# Patient Record
Sex: MALE | Race: WHITE | NOT HISPANIC OR LATINO
[De-identification: names, ages, dates, MRNs, and addresses within clinical notes are randomized per-mention and may not be internally consistent; named-entity substitution may affect disease eponyms.]

---
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
## 2025-01-31 ENCOUNTER — HOSPITAL ENCOUNTER (EMERGENCY)
Facility: HOSPITAL | Age: 20
Discharge: HOME | End: 2025-01-31
Attending: EMERGENCY MEDICINE
Payer: MEDICAID

## 2025-01-31 ENCOUNTER — APPOINTMENT (OUTPATIENT)
Dept: GENERAL RADIOLOGY | Facility: HOSPITAL | Age: 20
End: 2025-01-31
Attending: EMERGENCY MEDICINE
Payer: MEDICAID

## 2025-01-31 VITALS
OXYGEN SATURATION: 98 % | DIASTOLIC BLOOD PRESSURE: 89 MMHG | WEIGHT: 130 LBS | RESPIRATION RATE: 18 BRPM | TEMPERATURE: 98 F | SYSTOLIC BLOOD PRESSURE: 137 MMHG | HEART RATE: 102 BPM

## 2025-01-31 DIAGNOSIS — S46.911A MUSCLE STRAIN OF RIGHT UPPER ARM, INITIAL ENCOUNTER: ICD-10-CM

## 2025-01-31 DIAGNOSIS — S50.01XA CONTUSION OF RIGHT ELBOW, INITIAL ENCOUNTER: Primary | ICD-10-CM

## 2025-01-31 PROCEDURE — 99284 EMERGENCY DEPT VISIT MOD MDM: CPT

## 2025-01-31 PROCEDURE — 73080 X-RAY EXAM OF ELBOW: CPT | Performed by: EMERGENCY MEDICINE

## 2025-01-31 PROCEDURE — 99283 EMERGENCY DEPT VISIT LOW MDM: CPT

## 2025-01-31 NOTE — DISCHARGE INSTRUCTIONS
Take ibuprofen or Tylenol as needed for pain.  Follow-up with orthopedics if your pain persists.  Also follow-up with neurology and a primary care physician.  Do not drive or operate heavy machinery until cleared to do so by neurology.  Return to the emergency department if recurrent seizure, focal weakness, numbness, or other new symptoms develop.

## 2025-01-31 NOTE — ED INITIAL ASSESSMENT (HPI)
Pt c/o difficulty moving right arm since Saturday. Pt states he had a seizure on last Saturday and fell on right elbow.

## 2025-01-31 NOTE — ED PROVIDER NOTES
Patient Seen in: [facility] Emergency Department      History     Chief Complaint   Patient presents with    Arm or Hand Injury     Stated Complaint: seizure saturday, R arm pain    Subjective:   HPI      19-year-old male with no known significant past medical history presents with complaints of right arm pain.  The patient reports that he has had intermittent pain to his right arm over the the past year.  He reports that he had a seizure on 1/25 during which he fell onto his right elbow.  He states since that time he has had increased pain to both his elbow and his biceps area.  The patient was evaluated at [facility] after his seizure.  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  Was to follow-up with neurology for further evaluation and further diagnostic testing but signed out AGAINST MEDICAL ADVICE and has not arranged for any follow-up.  He denies any history of seizure prior to that episode.  He denies any subsequent seizures.    Objective:     Past Medical History:    HEART MURMUR    NL Echo per Mom              History reviewed. No pertinent surgical history.             Social History     Socioeconomic History    Marital status: Single   Tobacco Use    Smoking status: Some Days     Types: Cigarettes    Smokeless tobacco: Never   Vaping Use    Vaping status: Some Days   Substance and Sexual Activity    Alcohol use: Never    Drug use: Yes     Types: Cannabis                  Physical Exam     ED Triage Vitals [01/31/25 0934]   /89   Pulse 102   Resp 18   Temp 97.8 °F (36.6 °C)   Temp src    SpO2 98 %   O2 Device        Current Vitals:   Vital Signs  BP: 137/89  Pulse: 102  Resp: 18  Temp: 97.8 °F (36.6 °C)    Oxygen Therapy  SpO2: 98 %        Physical Exam  General Appearance: well appearing  Eyes: pupils equal and round no injection  Respiratory: chest is non tender, lungs are clear to auscultation  Cardiac: regular rate and rhythm  Musculoskeletal: neck is supple  and non tender         Tenderness to palpation to the olecranon process and lateral epicondyle of the right elbow.  There is some mild tenderness to palpation of the belly of the biceps on the right as well but biceps function is intact and patient is able to flex at the elbow against resistance.  No shoulder, wrist, or hand tenderness noted.  Bilateral radial pulses intact and symmetric.  Capillary refill less than 2 seconds to the fingertips.  Neurologic: Speech normal.  Motor and sensation is intact and symmetric to bilateral upper extremities.  Skin: no rashes or lesions    DIFFERENTIAL DIAGNOSIS: After history and physical exam differential diagnosis was considered for contusion, fracture, strain, or other      ED Course   Labs Reviewed - No data to display              MDM      Elbow x-ray was reviewed independently by me.  No fracture or dislocation identified.  Suspect elbow contusion.  Also possible muscular strain.  Will give the patient a sling for comfort and recommend ibuprofen or Tylenol for pain.  Will give orthopedic follow-up for further evaluation.  He is advised to follow through with further diagnostic testing regarding his seizure as recommended at the outside hospital.  He is also advised not to drive or operate heavy machinery until cleared to do so by neurology.        Medical Decision Making      Disposition and Plan     Clinical Impression:  1. Contusion of right elbow, initial encounter    2. Muscle strain of right upper arm, initial encounter         Disposition:  Discharge  1/31/2025 11:08 am    Follow-up:  Omid Goff MD  [facility] [place] [address] [phone]    Follow up            Medications Prescribed:  Current Discharge Medication List              Supplementary Documentation:

## 2025-05-15 ENCOUNTER — LAB ENCOUNTER (OUTPATIENT)
Dept: LAB | Age: 20
End: 2025-05-15
Attending: FAMILY MEDICINE
Payer: COMMERCIAL

## 2025-05-15 DIAGNOSIS — Z00.00 ROUTINE GENERAL MEDICAL EXAMINATION AT A HEALTH CARE FACILITY: Primary | ICD-10-CM
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
## 2025-05-15 LAB
ALBUMIN SERPL-MCNC: 4.9 G/DL (ref 3.2–4.8)
ALBUMIN/GLOB SERPL: 2 {RATIO} (ref 1–2)
ALP LIVER SERPL-CCNC: 54 U/L (ref 45–117)
ALT SERPL-CCNC: 13 U/L (ref 10–49)
ANION GAP SERPL CALC-SCNC: 6 MMOL/L (ref 0–18)
AST SERPL-CCNC: 16 U/L (ref ?–34)
BASOPHILS # BLD AUTO: 0.04 X10(3) UL (ref 0–0.2)
BASOPHILS NFR BLD AUTO: 0.6 %
BILIRUB SERPL-MCNC: 0.5 MG/DL (ref 0.3–1.2)
BUN BLD-MCNC: 9 MG/DL (ref 9–23)
BUN/CREAT SERPL: 9 (ref 10–20)
CALCIUM BLD-MCNC: 9.8 MG/DL (ref 8.7–10.4)
CHLORIDE SERPL-SCNC: 105 MMOL/L (ref 98–112)
CHOLEST SERPL-MCNC: 174 MG/DL (ref ?–200)
CO2 SERPL-SCNC: 29 MMOL/L (ref 21–32)
CREAT BLD-MCNC: 1 MG/DL (ref 0.7–1.3)
DEPRECATED RDW RBC AUTO: 44.8 FL (ref 35.1–46.3)
EGFRCR SERPLBLD CKD-EPI 2021: 111 ML/MIN/1.73M2 (ref 60–?)
EOSINOPHIL # BLD AUTO: 0.42 X10(3) UL (ref 0–0.7)
EOSINOPHIL NFR BLD AUTO: 6.4 %
ERYTHROCYTE [DISTWIDTH] IN BLOOD BY AUTOMATED COUNT: 13 % (ref 11–15)
FASTING PATIENT LIPID ANSWER: NO
FASTING STATUS PATIENT QL REPORTED: NO
GLOBULIN PLAS-MCNC: 2.5 G/DL (ref 2–3.5)
GLUCOSE BLD-MCNC: 100 MG/DL (ref 70–99)
HCT VFR BLD AUTO: 44.6 % (ref 39–53)
HDLC SERPL-MCNC: 65 MG/DL (ref 40–59)
HGB BLD-MCNC: 14.5 G/DL (ref 13–17.5)
IMM GRANULOCYTES # BLD AUTO: 0.01 X10(3) UL (ref 0–1)
IMM GRANULOCYTES NFR BLD: 0.2 %
LDLC SERPL CALC-MCNC: 97 MG/DL (ref ?–100)
LYMPHOCYTES # BLD AUTO: 2.04 X10(3) UL (ref 1.5–5)
LYMPHOCYTES NFR BLD AUTO: 31.1 %
MCH RBC QN AUTO: 30.1 PG (ref 26–34)
MCHC RBC AUTO-ENTMCNC: 32.5 G/DL (ref 31–37)
MCV RBC AUTO: 92.7 FL (ref 80–100)
MONOCYTES # BLD AUTO: 0.38 X10(3) UL (ref 0.1–1)
MONOCYTES NFR BLD AUTO: 5.8 %
NEUTROPHILS # BLD AUTO: 3.67 X10 (3) UL (ref 1.5–7.7)
NEUTROPHILS # BLD AUTO: 3.67 X10(3) UL (ref 1.5–7.7)
NEUTROPHILS NFR BLD AUTO: 55.9 %
NONHDLC SERPL-MCNC: 109 MG/DL (ref ?–130)
OSMOLALITY SERPL CALC.SUM OF ELEC: 289 MOSM/KG (ref 275–295)
PLATELET # BLD AUTO: 254 10(3)UL (ref 150–450)
POTASSIUM SERPL-SCNC: 4.6 MMOL/L (ref 3.5–5.1)
PROT SERPL-MCNC: 7.4 G/DL (ref 5.7–8.2)
RBC # BLD AUTO: 4.81 X10(6)UL (ref 4.3–5.7)
SODIUM SERPL-SCNC: 140 MMOL/L (ref 136–145)
T PALLIDUM AB SER QL IA: NONREACTIVE
TRIGL SERPL-MCNC: 63 MG/DL (ref 30–149)
TSI SER-ACNC: 0.41 UIU/ML (ref 0.48–4.17)
VLDLC SERPL CALC-MCNC: 10 MG/DL (ref 0–30)
WBC # BLD AUTO: 6.6 X10(3) UL (ref 4–11)

## 2025-05-15 PROCEDURE — 80061 LIPID PANEL: CPT

## 2025-05-15 PROCEDURE — 87389 HIV-1 AG W/HIV-1&-2 AB AG IA: CPT

## 2025-05-15 PROCEDURE — 80053 COMPREHEN METABOLIC PANEL: CPT

## 2025-05-15 PROCEDURE — 84443 ASSAY THYROID STIM HORMONE: CPT

## 2025-05-15 PROCEDURE — 87491 CHLMYD TRACH DNA AMP PROBE: CPT

## 2025-05-15 PROCEDURE — 87591 N.GONORRHOEAE DNA AMP PROB: CPT

## 2025-05-15 PROCEDURE — 85025 COMPLETE CBC W/AUTO DIFF WBC: CPT

## 2025-05-15 PROCEDURE — 36415 COLL VENOUS BLD VENIPUNCTURE: CPT

## 2025-05-15 PROCEDURE — 86696 HERPES SIMPLEX TYPE 2 TEST: CPT

## 2025-05-15 PROCEDURE — 86780 TREPONEMA PALLIDUM: CPT

## 2025-05-15 PROCEDURE — 86695 HERPES SIMPLEX TYPE 1 TEST: CPT

## 2025-05-16 LAB
C TRACH DNA SPEC QL NAA+PROBE: POSITIVE
HSV 1 GLYCOPROTEIN G, IGG: NEGATIVE
HSV 2 GLYCOPROTEIN G, IGG: NEGATIVE
N GONORRHOEA DNA SPEC QL NAA+PROBE: NEGATIVE

## (undated) NOTE — LETTER
Date & Time: 1/31/2025, 11:09 AM  Patient: Jame Lopez  Encounter Provider(s):    Jr Montenegro MD       To Whom It May Concern:    Jame Lopez was seen and treated in our department on 1/31/2025. He can return to work.    If you have any questions or concerns, please do not hesitate to call.        _____________________________  Physician/APC Signature